# Patient Record
Sex: FEMALE | Race: WHITE | NOT HISPANIC OR LATINO | Employment: FULL TIME | URBAN - METROPOLITAN AREA
[De-identification: names, ages, dates, MRNs, and addresses within clinical notes are randomized per-mention and may not be internally consistent; named-entity substitution may affect disease eponyms.]

---

## 2024-05-10 ENCOUNTER — PATIENT OUTREACH (OUTPATIENT)
Dept: CARDIOLOGY | Facility: CLINIC | Age: 34
End: 2024-05-10
Payer: COMMERCIAL

## 2024-05-10 RX ORDER — ASPIRIN 81 MG/1
81 TABLET ORAL DAILY
COMMUNITY

## 2024-05-10 RX ORDER — FUROSEMIDE 20 MG/1
20 TABLET ORAL DAILY
COMMUNITY
Start: 2024-05-10 | End: 2024-05-17

## 2024-05-10 RX ORDER — METOPROLOL SUCCINATE 50 MG/1
50 TABLET, EXTENDED RELEASE ORAL DAILY
COMMUNITY

## 2024-05-10 RX ORDER — AMOXICILLIN AND CLAVULANATE POTASSIUM 500; 125 MG/1; MG/1
1 TABLET, FILM COATED ORAL 2 TIMES DAILY
COMMUNITY
Start: 2024-05-10 | End: 2024-05-15

## 2024-05-10 RX ORDER — TRAMADOL HYDROCHLORIDE 50 MG/1
50 TABLET ORAL EVERY 8 HOURS PRN
COMMUNITY

## 2024-05-10 RX ORDER — WARFARIN SODIUM 5 MG/1
5 TABLET ORAL DAILY
COMMUNITY

## 2024-05-10 NOTE — PROGRESS NOTES
Discharge Facility:  [facility]  Discharge Diagnosis: Aneurysm of Aorta  Admission Date:  5/3/24  Discharge Date: 5/9/24    PCP Appointment Date: Dr Parra 5/17/24  Specialist Appointment Date: 5/15/24 Cardiology/Cardiothoracic  Hospital Encounter and Summary: Linked   See discharge assessment below for further details     Engagement  Call Start Time: 0849 (5/10/2024  8:57 AM)    Medications  Medications reviewed with patient/caregiver?: Yes (5/10/2024  8:57 AM)  Is the patient having any side effects they believe may be caused by any medication additions or changes?: No (5/10/2024  8:57 AM)  Does the patient have all medications ordered at discharge?: Yes (5/10/2024  8:57 AM)  Prescription Comments: Augmentin, coumadin, Tramdol, metoprolol, furosemide,ASA 81 (5/10/2024  8:57 AM)  Medication Comments: Patient is picking up Metoprolol today it wasnt ready yesterday (5/10/2024  8:57 AM)    Appointments  Does the patient have a primary care provider?: Yes (5/10/2024  8:57 AM)  Care Management Interventions: Verified appointment date/time/provider (5/10/2024  8:57 AM)  Care Management Interventions: Advised to schedule with specialist (5/10/2024  8:57 AM)    Self Management  What is the home health agency?: n/a (5/10/2024  8:57 AM)  What Durable Medical Equipment (DME) was ordered?: n/a (5/10/2024  8:57 AM)    Patient Teaching  Does the patient have access to their discharge instructions?: Yes (5/10/2024  8:57 AM)  Care Management Interventions: Reviewed instructions with patient (5/10/2024  8:57 AM)  What is the patient's perception of their health status since discharge?: Improving (5/10/2024  8:57 AM)  Is the patient/caregiver able to teach back the hierarchy of who to call/visit for symptoms/problems? PCP, Specialist, Home Health nurse, Urgent Care, ED, 911: Yes (5/10/2024  8:57 AM)  Patient/Caregiver Education Comments: Reviewed IS, patient states she does not have compression stocking and this CM encouraged her to  call the surgeon's office, Reviewed coumadin and s/s of bleeding and blood. Reviewed no driving for 6 weeks and encouraged walking as she can tolerate. Reviewed future labs and CXR. Reviewed upcoming appt with Dr Parra (5/10/2024  8:57 AM)    Wrap Up  Wrap Up Additional Comments: Patient states her pain has been managed with her Tramadol and Tylenol. Patient will have PT/INR drawn at [facility] on Monday and is aware of her alternating dose. Patient is encouraged to call surgeon's office in regards to compression stockings. Patient denies any signs of bleeding at this time. Patient continues use of IS. Patient is aware of her follow ups (5/10/2024  8:57 AM)

## 2024-05-17 ENCOUNTER — OFFICE VISIT (OUTPATIENT)
Dept: PRIMARY CARE | Facility: CLINIC | Age: 34
End: 2024-05-17
Payer: COMMERCIAL

## 2024-05-17 VITALS
SYSTOLIC BLOOD PRESSURE: 122 MMHG | DIASTOLIC BLOOD PRESSURE: 78 MMHG | BODY MASS INDEX: 40.82 KG/M2 | WEIGHT: 245 LBS | HEIGHT: 65 IN | TEMPERATURE: 97.6 F | HEART RATE: 82 BPM

## 2024-05-17 DIAGNOSIS — I71.9 AORTIC ANEURYSM WITHOUT RUPTURE, UNSPECIFIED PORTION OF AORTA (CMS-HCC): Primary | ICD-10-CM

## 2024-05-17 PROCEDURE — 99495 TRANSJ CARE MGMT MOD F2F 14D: CPT | Performed by: FAMILY MEDICINE

## 2024-05-17 RX ORDER — CHLORHEXIDINE GLUCONATE ORAL RINSE 1.2 MG/ML
SOLUTION DENTAL
COMMUNITY
Start: 2024-04-29

## 2024-05-17 ASSESSMENT — PATIENT HEALTH QUESTIONNAIRE - PHQ9
1. LITTLE INTEREST OR PLEASURE IN DOING THINGS: NOT AT ALL
SUM OF ALL RESPONSES TO PHQ9 QUESTIONS 1 AND 2: 0
2. FEELING DOWN, DEPRESSED OR HOPELESS: NOT AT ALL

## 2024-05-17 NOTE — PROGRESS NOTES
"    /78   Pulse 82   Temp 36.4 °C (97.6 °F)   Ht 1.651 m (5' 5\")   Wt 111 kg (245 lb)   BMI 40.77 kg/m²     No past medical history on file.    There is no problem list on file for this patient.      Current Outpatient Medications   Medication Sig Dispense Refill    chlorhexidine (Peridex) 0.12 % solution RINSE WITH 1/2 OUNCES BY MOUTH AFTER BREAKFAST AND BEFORE BEDTIME      aspirin 81 mg EC tablet Take 1 tablet (81 mg) by mouth once daily.      furosemide (Lasix) 20 mg tablet Take 1 tablet (20 mg) by mouth once daily.      levonorgestrel (Mirena) 21 mcg/24 hours (8 yrs) 52 mg IUD 52 mg by intrauterine route.      metoprolol succinate XL (Toprol-XL) 50 mg 24 hr tablet Take 1 tablet (50 mg) by mouth once daily. Do not crush or chew.      traMADol (Ultram) 50 mg tablet Take 1 tablet (50 mg) by mouth every 8 hours if needed for severe pain (7 - 10).      warfarin (Coumadin) 5 mg tablet Take 1 tablet (5 mg) by mouth once daily. Take as directed per After Visit Summary.  See directions       No current facility-administered medications for this visit.       CC/HPI/ASSESSMENT/PLAN    CC follow-up hospital    HPI patient hospitalized for open heart surgery to repair aortic aneurysm bicuspid valve.  She received artificial valve.  She was admitted on May 3 and discharged May 9 from the [facility].  She is following up today as TCM visit.  She notes overall doing very well.  She notes she still has some discomfort in the wound.  She notes there is been no redness.  She does question yellow discharge from the drain sites.  I reviewed hospital discharge notation as well as surgical report.  Medication list is reviewed.  Patient notes she is feeling like she has more energy since the surgery.  We discussed the surgical repair ultimately improved her cardiac output which will ultimately give her more energy and feel better.  She notes she can hear the artificial valve throughout the day.    Exam calm eyes no " jaundice neck supple lungs CTA CV RRR there is a loud closing sound of the artificial valve.  Skin exam reveals well-healing wound on the chest knee open-heart surgery.  There is 2 draining wounds in the upper abdominal area that are healing well.  Serosanguineous discharge is noted.  No evidence of cellulitis.    A/P 1 aortic aneurysm.  Status post repair.  Discharge date 5/9/2024.  Patient continue medications per discharge summary.  Surgical report reviewed.  Patient will have follow-up with cardiology as previously arranged.  Moderate complexity decision making.    There are no diagnoses linked to this encounter.

## 2024-05-22 PROBLEM — I71.9 AORTIC ANEURYSM WITHOUT RUPTURE (CMS-HCC): Status: ACTIVE | Noted: 2024-05-22

## 2024-05-29 ENCOUNTER — PATIENT OUTREACH (OUTPATIENT)
Dept: CARDIOLOGY | Facility: CLINIC | Age: 34
End: 2024-05-29
Payer: COMMERCIAL

## 2024-05-29 NOTE — PROGRESS NOTES
Call regarding appt. with PCP on 5/17/24 after hospitalization.  At time of outreach call the patient feels as if their condition has improved since last visit.  Reviewed the PCP appointment with the pt and addressed any questions or concerns.

## 2024-06-28 ENCOUNTER — PATIENT OUTREACH (OUTPATIENT)
Dept: CARDIOLOGY | Facility: CLINIC | Age: 34
End: 2024-06-28
Payer: COMMERCIAL

## 2024-07-18 ENCOUNTER — PATIENT OUTREACH (OUTPATIENT)
Dept: CARDIOLOGY | Facility: CLINIC | Age: 34
End: 2024-07-18
Payer: COMMERCIAL

## 2024-07-26 ENCOUNTER — TELEPHONE (OUTPATIENT)
Dept: PRIMARY CARE | Facility: CLINIC | Age: 34
End: 2024-07-26
Payer: COMMERCIAL

## 2024-07-26 DIAGNOSIS — I71.20 THORACIC AORTIC ANEURYSM WITHOUT RUPTURE, UNSPECIFIED PART (CMS-HCC): Primary | ICD-10-CM

## 2024-10-29 ASSESSMENT — DERMATOLOGY QUALITY OF LIFE (QOL) ASSESSMENT
WHAT SINGLE SKIN CONDITION LISTED BELOW IS THE PATIENT ANSWERING THE QUALITY-OF-LIFE ASSESSMENT QUESTIONS ABOUT: KELOIDS
RATE HOW BOTHERED YOU ARE BY SYMPTOMS OF YOUR SKIN PROBLEM (EG, ITCHING, STINGING BURNING, HURTING OR SKIN IRRITATION): 4
RATE HOW BOTHERED YOU ARE BY EFFECTS OF YOUR SKIN PROBLEMS ON YOUR ACTIVITIES (EG, GOING OUT, ACCOMPLISHING WHAT YOU WANT, WORK ACTIVITIES OR YOUR RELATIONSHIPS WITH OTHERS): 5
RATE HOW BOTHERED YOU ARE BY EFFECTS OF YOUR SKIN PROBLEMS ON YOUR ACTIVITIES (EG, GOING OUT, ACCOMPLISHING WHAT YOU WANT, WORK ACTIVITIES OR YOUR RELATIONSHIPS WITH OTHERS): 5
RATE HOW BOTHERED YOU ARE BY SYMPTOMS OF YOUR SKIN PROBLEM (EG, ITCHING, STINGING BURNING, HURTING OR SKIN IRRITATION): 4
WHAT SINGLE SKIN CONDITION LISTED BELOW IS THE PATIENT ANSWERING THE QUALITY-OF-LIFE ASSESSMENT QUESTIONS ABOUT: KELOIDS
RATE HOW EMOTIONALLY BOTHERED YOU ARE BY YOUR SKIN PROBLEM (FOR EXAMPLE, WORRY, EMBARRASSMENT, FRUSTRATION): 5
RATE HOW EMOTIONALLY BOTHERED YOU ARE BY YOUR SKIN PROBLEM (FOR EXAMPLE, WORRY, EMBARRASSMENT, FRUSTRATION): 5

## 2024-10-30 ENCOUNTER — APPOINTMENT (OUTPATIENT)
Dept: DERMATOLOGY | Facility: CLINIC | Age: 34
End: 2024-10-30
Payer: COMMERCIAL

## 2024-10-30 DIAGNOSIS — Z12.83 SCREENING EXAM FOR SKIN CANCER: ICD-10-CM

## 2024-10-30 DIAGNOSIS — L91.0 KELOID: ICD-10-CM

## 2024-10-30 DIAGNOSIS — D22.9 NEVUS: Primary | ICD-10-CM

## 2024-10-30 PROCEDURE — 1036F TOBACCO NON-USER: CPT | Performed by: NURSE PRACTITIONER

## 2024-10-30 PROCEDURE — 99203 OFFICE O/P NEW LOW 30 MIN: CPT | Performed by: NURSE PRACTITIONER

## 2024-10-30 PROCEDURE — 11900 INJECT SKIN LESIONS </W 7: CPT | Performed by: NURSE PRACTITIONER

## 2024-10-30 RX ORDER — TRIAMCINOLONE ACETONIDE 40 MG/ML
40 INJECTION, SUSPENSION INTRA-ARTICULAR; INTRAMUSCULAR ONCE
Status: COMPLETED | OUTPATIENT
Start: 2024-10-30 | End: 2024-10-30

## 2024-11-26 ASSESSMENT — DERMATOLOGY QUALITY OF LIFE (QOL) ASSESSMENT
RATE HOW BOTHERED YOU ARE BY SYMPTOMS OF YOUR SKIN PROBLEM (EG, ITCHING, STINGING BURNING, HURTING OR SKIN IRRITATION): 4
RATE HOW BOTHERED YOU ARE BY SYMPTOMS OF YOUR SKIN PROBLEM (EG, ITCHING, STINGING BURNING, HURTING OR SKIN IRRITATION): 4
WHAT SINGLE SKIN CONDITION LISTED BELOW IS THE PATIENT ANSWERING THE QUALITY-OF-LIFE ASSESSMENT QUESTIONS ABOUT: KELOIDS
RATE HOW EMOTIONALLY BOTHERED YOU ARE BY YOUR SKIN PROBLEM (FOR EXAMPLE, WORRY, EMBARRASSMENT, FRUSTRATION): 4
WHAT SINGLE SKIN CONDITION LISTED BELOW IS THE PATIENT ANSWERING THE QUALITY-OF-LIFE ASSESSMENT QUESTIONS ABOUT: KELOIDS
RATE HOW EMOTIONALLY BOTHERED YOU ARE BY YOUR SKIN PROBLEM (FOR EXAMPLE, WORRY, EMBARRASSMENT, FRUSTRATION): 4
RATE HOW BOTHERED YOU ARE BY EFFECTS OF YOUR SKIN PROBLEMS ON YOUR ACTIVITIES (EG, GOING OUT, ACCOMPLISHING WHAT YOU WANT, WORK ACTIVITIES OR YOUR RELATIONSHIPS WITH OTHERS): 2
DATE THE QUALITY-OF-LIFE ASSESSMENT WAS COMPLETED: 67149
RATE HOW BOTHERED YOU ARE BY EFFECTS OF YOUR SKIN PROBLEMS ON YOUR ACTIVITIES (EG, GOING OUT, ACCOMPLISHING WHAT YOU WANT, WORK ACTIVITIES OR YOUR RELATIONSHIPS WITH OTHERS): 2

## 2024-11-27 ENCOUNTER — APPOINTMENT (OUTPATIENT)
Dept: DERMATOLOGY | Facility: CLINIC | Age: 34
End: 2024-11-27
Payer: COMMERCIAL

## 2024-11-27 DIAGNOSIS — L91.0 KELOID: Primary | ICD-10-CM

## 2024-11-27 PROCEDURE — 1036F TOBACCO NON-USER: CPT | Performed by: NURSE PRACTITIONER

## 2024-11-27 PROCEDURE — 11900 INJECT SKIN LESIONS </W 7: CPT | Performed by: NURSE PRACTITIONER

## 2024-11-27 RX ORDER — TRIAMCINOLONE ACETONIDE 40 MG/ML
40 INJECTION, SUSPENSION INTRA-ARTICULAR; INTRAMUSCULAR ONCE
Status: COMPLETED | OUTPATIENT
Start: 2024-11-27 | End: 2024-11-27

## 2024-11-27 NOTE — PROGRESS NOTES
Subjective     Miriam Rey is a 34 y.o. female who presents for the following: Keloid.   Established patient in for follow up keloid to chest last seen 10/2024 and treated with 0.4 ml of 40 mg/mL Kenalog.    Review of Systems:  No other skin or systemic complaints other than what is documented elsewhere in the note.    The following portions of the chart were reviewed this encounter and updated as appropriate:       Skin Cancer History  No skin cancer on file.    Specialty Problems    None    Past Medical History:  Miriam Rey  has no past medical history on file.    Past Surgical History:  Miriam Rey  has no past surgical history on file.    Family History:  Patient family history is not on file.    Social History:  Miriam Rey  reports that she has never smoked. She has never used smokeless tobacco. She reports that she does not currently use alcohol. She reports that she does not use drugs.    Allergies:  Patient has no known allergies.    Current Medications / CAM's:    Current Outpatient Medications:     aspirin 81 mg EC tablet, Take 1 tablet (81 mg) by mouth once daily., Disp: , Rfl:     chlorhexidine (Peridex) 0.12 % solution, RINSE WITH 1/2 OUNCES BY MOUTH AFTER BREAKFAST AND BEFORE BEDTIME, Disp: , Rfl:     levonorgestrel (Mirena) 21 mcg/24 hours (8 yrs) 52 mg IUD, 52 mg by intrauterine route., Disp: , Rfl:     metoprolol succinate XL (Toprol-XL) 50 mg 24 hr tablet, Take 1 tablet (50 mg) by mouth once daily. Do not crush or chew., Disp: , Rfl:     warfarin (Coumadin) 5 mg tablet, Take 1 tablet (5 mg) by mouth once daily. Take as directed per After Visit Summary. See directions, Disp: , Rfl:   No current facility-administered medications for this visit.     Objective   Well appearing patient in no apparent distress; mood and affect are within normal limits.      Assessment/Plan   1. Keloid  Chest - Medial (Center)  Fibrotic nodular plaque improved    -Discussed nature of  condition  -Discussed treatment options  -After discussion, patient wishes to proceed with intralesional kenalog injections.    -After history, physical examination and discussion, a decision was made to proceed with intralesional kenalog therapy today  -Risks, benefits and alternatives of intralesional steroids was discussed with patient including the risk of atrophy, striae, telangiectasia, and pigmentary changes. Patient expresses understanding of these risks and verbal consent was obtained from the patient to treat with intralesional Kenalog.      triamcinolone acetonide (Kenalog-40) injection 40 mg - Chest - Medial (Center)

## 2024-12-30 ENCOUNTER — APPOINTMENT (OUTPATIENT)
Dept: DERMATOLOGY | Facility: CLINIC | Age: 34
End: 2024-12-30
Payer: COMMERCIAL

## 2025-06-16 ENCOUNTER — APPOINTMENT (OUTPATIENT)
Dept: DERMATOLOGY | Facility: CLINIC | Age: 35
End: 2025-06-16
Payer: COMMERCIAL

## 2025-06-16 DIAGNOSIS — L91.0 KELOID: Primary | ICD-10-CM

## 2025-06-16 PROCEDURE — 1036F TOBACCO NON-USER: CPT | Performed by: NURSE PRACTITIONER

## 2025-06-16 PROCEDURE — 11901 INJECT SKIN LESIONS >7: CPT | Performed by: NURSE PRACTITIONER

## 2025-06-16 RX ORDER — TRIAMCINOLONE ACETONIDE 40 MG/ML
40 INJECTION, SUSPENSION INTRA-ARTICULAR; INTRAMUSCULAR ONCE
Status: COMPLETED | OUTPATIENT
Start: 2025-06-16 | End: 2025-06-16

## 2025-06-16 RX ADMIN — TRIAMCINOLONE ACETONIDE 40 MG: 40 INJECTION, SUSPENSION INTRA-ARTICULAR; INTRAMUSCULAR at 13:02

## 2025-06-16 NOTE — Clinical Note
-Discussed nature of condition  -Discussed treatment options  -After discussion, patient wishes to proceed with intralesional kenalog injections.    -After history, physical examination and discussion, a decision was made to proceed with intralesional kenalog therapy today  -Risks, benefits and alternatives of intralesional steroids was discussed with patient including the risk of atrophy, striae, telangiectasia, and pigmentary changes. Patient expresses understanding of these risks and verbal consent was obtained from the patient to treat with intralesional Kenalog.

## 2025-06-16 NOTE — PROGRESS NOTES
Subjective     Miriam Rey is a 34 y.o. female who presents for the following: Keloid.   Established patient in for follow up last seen for keloid to chest 11/2024, treated with ILK injection.     Review of Systems:  No other skin or systemic complaints other than what is documented elsewhere in the note.    The following portions of the chart were reviewed this encounter and updated as appropriate:       Skin Cancer History  Biopsy Log Book  No skin cancers from Specimen Tracking.    Additional History      Specialty Problems    None    Past Medical History:  Miriam Rey  has no past medical history on file.    Past Surgical History:  Miriam Rey  has no past surgical history on file.    Family History:  Patient family history is not on file.    Social History:  Miriam Rey  reports that she has never smoked. She has never used smokeless tobacco. She reports that she does not currently use alcohol. She reports that she does not use drugs.    Allergies:  Patient has no known allergies.    Current Medications / CAM's:  Current Medications[1]     Objective   Well appearing patient in no apparent distress; mood and affect are within normal limits.      Assessment/Plan   Skin Exam  1. KELOID  Chest - Medial (Center)  3cm fibrotic nodular plaque improved with ILK previously.   -Discussed nature of condition  -Discussed treatment options  -After discussion, patient wishes to proceed with intralesional kenalog injections.    -After history, physical examination and discussion, a decision was made to proceed with intralesional kenalog therapy today  -Risks, benefits and alternatives of intralesional steroids was discussed with patient including the risk of atrophy, striae, telangiectasia, and pigmentary changes. Patient expresses understanding of these risks and verbal consent was obtained from the patient to treat with intralesional Kenalog.    triamcinolone acetonide (Kenalog-40)  injection 40 mg - Chest - Medial (Center)              [1]   Current Outpatient Medications:     aspirin 81 mg EC tablet, Take 1 tablet (81 mg) by mouth once daily., Disp: , Rfl:     chlorhexidine (Peridex) 0.12 % solution, RINSE WITH 1/2 OUNCES BY MOUTH AFTER BREAKFAST AND BEFORE BEDTIME, Disp: , Rfl:     levonorgestrel (Mirena) 21 mcg/24 hours (8 yrs) 52 mg IUD, 52 mg by intrauterine route., Disp: , Rfl:     metoprolol succinate XL (Toprol-XL) 50 mg 24 hr tablet, Take 1 tablet (50 mg) by mouth once daily. Do not crush or chew., Disp: , Rfl:     warfarin (Coumadin) 5 mg tablet, Take 1 tablet (5 mg) by mouth once daily. Take as directed per After Visit Summary. See directions, Disp: , Rfl:   No current facility-administered medications for this visit.

## 2025-06-30 ENCOUNTER — APPOINTMENT (OUTPATIENT)
Dept: PRIMARY CARE | Facility: CLINIC | Age: 35
End: 2025-06-30
Payer: COMMERCIAL

## 2025-06-30 VITALS
HEIGHT: 65 IN | BODY MASS INDEX: 42.42 KG/M2 | TEMPERATURE: 98 F | WEIGHT: 254.6 LBS | DIASTOLIC BLOOD PRESSURE: 100 MMHG | SYSTOLIC BLOOD PRESSURE: 136 MMHG | HEART RATE: 52 BPM

## 2025-06-30 DIAGNOSIS — I48.91 ATRIAL FIBRILLATION, UNSPECIFIED TYPE (MULTI): ICD-10-CM

## 2025-06-30 DIAGNOSIS — E55.9 VITAMIN D DEFICIENCY: ICD-10-CM

## 2025-06-30 DIAGNOSIS — I10 PRIMARY HYPERTENSION: ICD-10-CM

## 2025-06-30 DIAGNOSIS — E53.8 VITAMIN B12 DEFICIENCY: ICD-10-CM

## 2025-06-30 DIAGNOSIS — R51.9 INTRACTABLE HEADACHE, UNSPECIFIED CHRONICITY PATTERN, UNSPECIFIED HEADACHE TYPE: Primary | ICD-10-CM

## 2025-06-30 PROBLEM — E66.813 OBESITY, CLASS III, BMI 40-49.9 (MORBID OBESITY): Status: ACTIVE | Noted: 2024-05-03

## 2025-06-30 PROBLEM — I71.21 ANEURYSM OF ASCENDING AORTA WITHOUT RUPTURE: Status: ACTIVE | Noted: 2024-04-19

## 2025-06-30 PROBLEM — D62 ABLA (ACUTE BLOOD LOSS ANEMIA): Status: ACTIVE | Noted: 2024-05-03

## 2025-06-30 PROBLEM — E87.70 HYPERVOLEMIA: Status: ACTIVE | Noted: 2024-05-04

## 2025-06-30 PROBLEM — R73.9 STRESS HYPERGLYCEMIA: Status: ACTIVE | Noted: 2024-05-03

## 2025-06-30 PROBLEM — Z95.2 HISTORY OF MECHANICAL AORTIC VALVE REPLACEMENT: Status: ACTIVE | Noted: 2024-05-09

## 2025-06-30 PROBLEM — G89.18 PAIN, POSTOPERATIVE, ACUTE: Status: ACTIVE | Noted: 2024-05-03

## 2025-06-30 PROBLEM — J45.909 ASTHMA: Status: ACTIVE | Noted: 2024-05-04

## 2025-06-30 PROBLEM — J98.11 ATELECTASIS: Status: ACTIVE | Noted: 2024-05-04

## 2025-06-30 PROCEDURE — 3008F BODY MASS INDEX DOCD: CPT | Performed by: FAMILY MEDICINE

## 2025-06-30 PROCEDURE — 1036F TOBACCO NON-USER: CPT | Performed by: FAMILY MEDICINE

## 2025-06-30 PROCEDURE — 3075F SYST BP GE 130 - 139MM HG: CPT | Performed by: FAMILY MEDICINE

## 2025-06-30 PROCEDURE — 99214 OFFICE O/P EST MOD 30 MIN: CPT | Performed by: FAMILY MEDICINE

## 2025-06-30 PROCEDURE — 3080F DIAST BP >= 90 MM HG: CPT | Performed by: FAMILY MEDICINE

## 2025-06-30 RX ORDER — ELETRIPTAN HYDROBROMIDE 40 MG/1
40 TABLET, FILM COATED ORAL ONCE AS NEEDED
Qty: 9 TABLET | Refills: 3 | Status: SHIPPED | OUTPATIENT
Start: 2025-06-30 | End: 2026-06-30

## 2025-06-30 NOTE — PROGRESS NOTES
"    BP (!) 136/100   Pulse 52   Temp 36.7 °C (98 °F)   Ht 1.651 m (5' 5\")   Wt 115 kg (254 lb 9.6 oz)   BMI 42.37 kg/m²     Medical History[1]    Problem List[2]    Current Medications[3]    CC/HPI/ASSESSMENT/PLAN    CC headache    HPI patient 34-year-old female who had valve replacement surgery last year.  She has atrial fibrillation and elevated blood pressure.  She taking metoprolol 50 mg daily.  She notes she has been having severe headaches, she describes an aura most headaches prior to the headache.  She notes her right face becomes numb and tingly.  Headache lasts much of the day.  She is unable to take anti-inflammatory medications due to being on Coumadin for atrial fibrillation.  She has no past history of headaches this severe.  Denies neurologic deficits.  Non-smoker    Exam calm ears clear mouth moist throat clear neck supple no LAD goiter no carotid bruit lungs CTA CV RRR the murmur of valve replacement heard.  Ext no edema skin no rash neuro alert oriented CN II through XII intact no cognitive changes noted no neurologic deficits noted    A/P 1.  Headaches due to severity and intensity and new onset of the headache a CAT scan of the head is ordered to rule out intracranial mass.  Patient unable to have MRIs due to the valve replacement.  Patient will have blood work.  She will monitor blood pressure closely at home.  We discussed at length the possibility that her headaches may be migrainous due to her auras that she describes.  Relpax is ordered for her to try instructions of use given.  She will follow-up in 8 to 10 weeks sooner as needed.  2 atrial fibrillation chronic stable 3 hypertension chronic stable patient will monitor blood pressure at home    There are no diagnoses linked to this encounter.          [1] History reviewed. No pertinent past medical history.  [2]   Patient Active Problem List  Diagnosis    Aortic aneurysm without rupture    ABLA (acute blood loss anemia)    Aneurysm of " ascending aorta without rupture    Aortic insufficiency    Asthma    Atelectasis    Bicuspid aortic valve    Calcific aortic stenosis of bicuspid valve    History of mechanical aortic valve replacement    Hypertension    Hypervolemia    Obesity, Class III, BMI 40-49.9 (morbid obesity)    Pain, postoperative, acute    Stress hyperglycemia   [3]   Current Outpatient Medications   Medication Sig Dispense Refill    levonorgestrel (Mirena) 21 mcg/24 hours (8 yrs) 52 mg IUD 52 mg by intrauterine route.      metoprolol succinate XL (Toprol-XL) 50 mg 24 hr tablet Take 1 tablet (50 mg) by mouth once daily. Do not crush or chew.      warfarin (Coumadin) 5 mg tablet Take 1 tablet (5 mg) by mouth once daily. Take as directed per After Visit Summary.  See directions      aspirin 81 mg EC tablet Take 1 tablet (81 mg) by mouth once daily. (Patient not taking: Reported on 6/30/2025)      chlorhexidine (Peridex) 0.12 % solution RINSE WITH 1/2 OUNCES BY MOUTH AFTER BREAKFAST AND BEFORE BEDTIME (Patient not taking: Reported on 6/30/2025)       No current facility-administered medications for this visit.

## 2025-07-07 ENCOUNTER — HOSPITAL ENCOUNTER (OUTPATIENT)
Dept: RADIOLOGY | Facility: CLINIC | Age: 35
Discharge: HOME | End: 2025-07-07
Payer: COMMERCIAL

## 2025-07-07 DIAGNOSIS — R51.9 INTRACTABLE HEADACHE, UNSPECIFIED CHRONICITY PATTERN, UNSPECIFIED HEADACHE TYPE: ICD-10-CM

## 2025-07-07 PROCEDURE — 70450 CT HEAD/BRAIN W/O DYE: CPT | Performed by: RADIOLOGY

## 2025-07-07 PROCEDURE — 70450 CT HEAD/BRAIN W/O DYE: CPT

## 2025-07-21 ENCOUNTER — APPOINTMENT (OUTPATIENT)
Dept: DERMATOLOGY | Facility: CLINIC | Age: 35
End: 2025-07-21
Payer: COMMERCIAL

## 2025-08-29 ENCOUNTER — APPOINTMENT (OUTPATIENT)
Dept: PRIMARY CARE | Facility: CLINIC | Age: 35
End: 2025-08-29
Payer: COMMERCIAL

## 2025-10-27 ENCOUNTER — APPOINTMENT (OUTPATIENT)
Dept: DERMATOLOGY | Facility: CLINIC | Age: 35
End: 2025-10-27
Payer: COMMERCIAL